# Patient Record
Sex: FEMALE | Race: WHITE
[De-identification: names, ages, dates, MRNs, and addresses within clinical notes are randomized per-mention and may not be internally consistent; named-entity substitution may affect disease eponyms.]

---

## 2019-04-28 ENCOUNTER — HOSPITAL ENCOUNTER (EMERGENCY)
Dept: HOSPITAL 8 - ED | Age: 49
Discharge: HOME | End: 2019-04-28
Payer: COMMERCIAL

## 2019-04-28 VITALS — WEIGHT: 185.19 LBS | HEIGHT: 66 IN | BODY MASS INDEX: 29.76 KG/M2

## 2019-04-28 VITALS — SYSTOLIC BLOOD PRESSURE: 123 MMHG | DIASTOLIC BLOOD PRESSURE: 70 MMHG

## 2019-04-28 DIAGNOSIS — E03.9: ICD-10-CM

## 2019-04-28 DIAGNOSIS — J45.31: Primary | ICD-10-CM

## 2019-04-28 PROCEDURE — 93005 ELECTROCARDIOGRAM TRACING: CPT

## 2019-04-28 PROCEDURE — 71045 X-RAY EXAM CHEST 1 VIEW: CPT

## 2019-04-28 PROCEDURE — 99283 EMERGENCY DEPT VISIT LOW MDM: CPT

## 2019-04-28 NOTE — NUR
ROOM AIR OXYGEN 95%. SPEAKING IN FULL SENTENCES, DENIES COMPLAINTS AT THIS 
TIME. PREPARE FOR DISCHARGE.

## 2019-04-28 NOTE — NUR
BIB [facility]-c/o SOB since last night at 0300, hx asthma. Pt received albuterol 
breathing tx x1 in the ambulance and states this has helped her breathing 
significantly. Pt speaking in full sentences, resp even and unlabored, NADN. Pt 
placed in gown, positioned for comfort in bed with warm blanket. Continuous 
heart, oxygen and BP monitors applied, all safety measures observed.